# Patient Record
Sex: MALE | Race: WHITE | NOT HISPANIC OR LATINO | Employment: FULL TIME | URBAN - METROPOLITAN AREA
[De-identification: names, ages, dates, MRNs, and addresses within clinical notes are randomized per-mention and may not be internally consistent; named-entity substitution may affect disease eponyms.]

---
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
## 2020-03-25 ENCOUNTER — TELEPHONE (OUTPATIENT)
Dept: NEUROLOGY | Facility: CLINIC | Age: 20
End: 2020-03-25

## 2020-03-25 NOTE — TELEPHONE ENCOUNTER
----- Message from Deisy Keys sent at 3/25/2020  3:10 PM CDT -----  Contact: Caryn Aguero (grandmother) @ [phone]  Pts grandmother is requesting to speak with Dr Stoddard.  She says pt had a small seizure for the 1st time in a year and a half.  Would like to see if he needs to increase his medication.  Pls call.

## 2020-03-25 NOTE — TELEPHONE ENCOUNTER
Per patients grandmother, she believes patient may have has a small episode but she is unsure if he missed any dosages. Attempted to call pt but there was no answer. Left message

## 2020-03-26 ENCOUNTER — TELEPHONE (OUTPATIENT)
Dept: NEUROLOGY | Facility: CLINIC | Age: 20
End: 2020-03-26

## 2020-03-27 ENCOUNTER — TELEPHONE (OUTPATIENT)
Dept: NEUROLOGY | Facility: CLINIC | Age: 20
End: 2020-03-27

## 2020-03-27 NOTE — TELEPHONE ENCOUNTER
----- Message from Juli Whitmore RN sent at 3/26/2020  5:31 PM CDT -----  Contact: Joelle ( [facility] ) @ [phone]      ----- Message -----  From: Nicholas Diamond  Sent: 3/26/2020   2:59 PM CDT  To: Richi [facility]tan Staff    Caller requesting a return call from Mary to discuss pt's recent seizure activity, pls call

## 2020-03-30 DIAGNOSIS — R56.9 SEIZURES: Primary | ICD-10-CM

## 2020-04-27 ENCOUNTER — TELEPHONE (OUTPATIENT)
Dept: NEUROLOGY | Facility: CLINIC | Age: 20
End: 2020-04-27

## 2020-04-27 DIAGNOSIS — R56.9 SEIZURES: Primary | ICD-10-CM

## 2020-04-27 NOTE — TELEPHONE ENCOUNTER
----- Message from Mary Queen MA sent at 4/27/2020  1:03 PM CDT -----  Contact: Radha (mother) @ [phone]      ----- Message -----  From: Deisy Keys  Sent: 4/27/2020   8:26 AM CDT  To: Richi [facility]tan Staff    Calling to see if pt can be scheduled for labs.  Would like to know if it can be done outside of the hospital.  Pls call.

## 2020-05-12 ENCOUNTER — PATIENT MESSAGE (OUTPATIENT)
Dept: NEUROLOGY | Facility: CLINIC | Age: 20
End: 2020-05-12

## 2020-05-13 ENCOUNTER — PATIENT MESSAGE (OUTPATIENT)
Dept: NEUROLOGY | Facility: CLINIC | Age: 20
End: 2020-05-13

## 2020-05-15 ENCOUNTER — TELEPHONE (OUTPATIENT)
Dept: NEUROLOGY | Facility: CLINIC | Age: 20
End: 2020-05-15

## 2020-05-15 ENCOUNTER — OFFICE VISIT (OUTPATIENT)
Dept: NEUROLOGY | Facility: CLINIC | Age: 20
End: 2020-05-15
Payer: COMMERCIAL

## 2020-05-15 ENCOUNTER — PATIENT OUTREACH (OUTPATIENT)
Dept: ADMINISTRATIVE | Facility: OTHER | Age: 20
End: 2020-05-15

## 2020-05-15 DIAGNOSIS — F40.11 GENERALIZED SOCIAL PHOBIA: ICD-10-CM

## 2020-05-15 DIAGNOSIS — G43.019 COMMON MIGRAINE WITH INTRACTABLE MIGRAINE: ICD-10-CM

## 2020-05-15 DIAGNOSIS — G40.109: Primary | ICD-10-CM

## 2020-05-15 PROCEDURE — 99213 PR OFFICE/OUTPT VISIT, EST, LEVL III, 20-29 MIN: ICD-10-PCS | Mod: 95,,, | Performed by: PSYCHIATRY & NEUROLOGY

## 2020-05-15 PROCEDURE — 99213 OFFICE O/P EST LOW 20 MIN: CPT | Mod: 95,,, | Performed by: PSYCHIATRY & NEUROLOGY

## 2020-05-15 RX ORDER — ZONISAMIDE 100 MG/1
600 CAPSULE ORAL NIGHTLY
Qty: 540 CAPSULE | OUTPATIENT
Start: 2020-05-15 | End: 2020-05-28 | Stop reason: SDUPTHER

## 2020-05-15 NOTE — PROGRESS NOTES
"Name: Lakhwinder Li  MRN: [id number]   CSN: [id number]      Date: 05/15/2020    HISTORY OF PRESENT ILLNESS (HPI)  The patient is a 19 y.o. yo RHWM   The patient was initially referred for consultation by Dr. Chaney  The patient was accompanied by his mother and Grandmother who provided some of the history.      Clinic Visits  Interim History  Initially after starting zonisamide 400 mg a day was controlled.  Patient now reports that he has had recurrence nocturnal seizures.  He has not change the dose of medications.  He does have migraines which are infrequent and respond well to sumatriptan.  Is not started any new medications has had no new interim medical problems arise.    2019/07/12  Patient was EMU and recorded 7 seizures which were of frontal lobe origin.  At D/C he was started on zonisamide and takes 400 mg QD and has been seizure free since starting it.  He takes his meds BID and has been adherent.  He has no other medical problems.      Results for LAKHWINDER LI (MRN [id number]) as of 7/11/2019 16:06   Ref. Range 12/11/2018 13:56 1/10/2019 13:54   Zonisamide Latest Ref Range: 10 - 40 mcg/mL 24 27     Epilepsy History  Lakhwinder is referred for continued followup for chief complaint of abnormal noises. Family reports spells of random vocalizations.  Family show video in which Lakhwinder is walking and makes incomprennesible  sound while walking.  Onset about September 2017.  No triggers not worsen by anything.  No eye blinks twithcws, etc.      History of seizures starting around age of 9 years.  These were different from what he experiences today.  He would wake up with these but would not yell.  Further description is not available.  None in several years.      Feels at thimes that his "brain is dead".     He has migraines starting age 9.  They start with photo and phonophobia. Then throbbing bifrontal HA often lateralized to R or L side.   He has had N/V.  He would lie down in dark quiet room and try to go to sleep.  "     He graduated from  and presently is a freshman in college but is not enrolled this semester.       ED visits  Episodes of SE  Change in meds    Seizure Seminology  Seizure Type 1  Classification:   Aura -   Ictus  - Nonconv -  - Conv -  - Duration -   Post-ictal  - Symptoms  - Duration  Age of onset   Current Seizure Frequency -    Last Seizure    Seizure Type 2  Classification:   Aura -   Ictus  - Nonconv -  - Conv -  - Duration -   Post-ictal  - Symptoms  - Duration  Age of onset   Current Seizure Frequency -  Last Seizure    sz per month 2015 2016 2017 2018 2019 2020   Luis Enrique         Feb         Mar         Apr         May         Jd         Jul         Aug         Sep         Oct         Nov         Dec         Tot           Seizure Triggers  Sleep Deprivation -  None  Other medications -  None   Benadral   Tramadol   Other  Psych/stress -  None  Photic stimulation -  None  Hyperventilation -  None  Medical Problems -  None  Menses -   No  Sensory Stimulation    Light  No   Sound  No   Problem Solv No   Other  No  Missed dose of Rx None    AED Treatments  Present regimen      Prior treatments      Not tried  acetazolamide (Diamox, AZM)  Amantadine  brivitiracetam (Briviact, BRV)  carbamazepine (Tegretol, CBZ)  clobazam (Onfi or Frizium, CLB)  ethosuximide (Zarontin, ESM)  eslicarbazine (Aptiom, ESL)  felbamate (felbatol, FBM)  gabapentin (Neurontin, GPN)  lacosamide (Vimpat, LCS)   lamotrigine (Lamictal, LTG)   levetiracetam (Keppra, LEV)  methsuximide (Celontin, MSM)  oxcarbazepine (Trileptal OXC)  perampanel (Fycompa, FCP)   phenobarbital (Pb)  phenytoin (Dilantin, PHT)  pregabalin (Lyrica, PGB)  primidone (Mysoline, PRM)  rufinamide (Banzel, RUF)  tiagabine (Gabatril,  TGB)  topiramate (Topamax, TPM)  viagabatrin, (Sabril, VGB)  vagal nerve stimulator (VNS)  valproic acid (Depakote, VPA)  zonisamide (Zonegran, ZNA)  Benzodiazepines  diazepam - rectal (Diastatl)  diazepam - oral (Valium, DZ)  clonazepam  (Klonopin, CZP)  clorazepate (Tranxene, CLZ)  Ativan  Brain Stimulation  Vagal Nerve Stimulation-n/a  DBS- n/a    Adherence/Compliance method  Memory - yes  Mom or Spouse - Yes  Pill Box - no  Jluis calendar - no  Turn over medication bottle - no  Phone alarm - no    Seizure Evaluation  EEG Routine   - abnormal electroencephalogram due to the presence of burst of focal spikes over bilateral anterior parasagittal head regions with shifting lateralization.     EEG Ambulatory -   EEG\Video Monitoring   2018/11/08 - This is an abnormal long-term video EEG monitoring study due to   the presence of seven clinical seizures that were captured.  Based off of the   semiology and to a lesser degree the electrographic findings, these do appear to   be frontal lobe seizures, though it is rather difficult to comment with any   degree of confidence regarding the lateralization of the seizure focus.  MRI/MRA   - 2018/07/06  - Subtle small ill-defined region of T2 FLAIR signal-abnormality right centrum semiovale which is nonspecific differential to include sequela of remote vascular event.    CT/CTA Scan -   PET Scan -   Neuropsychological evaluation -   DEXA Scan    Potential Epilepsy Risk Factors:   Pregnancy/Labor/Delivery - full term uncomplicated pregnancy labor and vaginal delivery  Febrile seizures - none  Head injury  - none  CNS infection - none     Stroke - none  Family Hx of Sz - none    PAST MEDICAL HISTORY:   Active Ambulatory Problems     Diagnosis Date Noted    Common migraine with intractable migraine 09/22/2014    Elevated liver enzymes 07/13/2018    Focal seizure 07/21/2018    Nocturnal frontal lobe epilepsy type 1 10/25/2018    Anxiety disorder 10/25/2018    Epilepsy 11/06/2018     Resolved Ambulatory Problems     Diagnosis Date Noted    Right hand pain 02/19/2016    Chronic pain of right knee 09/22/2017    Achilles tendon pain 09/22/2017    Patellar tendonitis of right knee 09/22/2017    Chronic midline  low back pain 10/06/2017    Weakness of trunk musculature 10/23/2017    Muscle tightness 10/23/2017    Decreased ROM of lumbar spine 10/23/2017    Abnormal vocalization 06/29/2018     Past Medical History:   Diagnosis Date    Allergy     Headache     Seizures         PAST SURGICAL HISTORY:   Past Surgical History:   Procedure Laterality Date    TYMPANOSTOMY TUBE PLACEMENT          FAMILY HISTORY:   Family History   Problem Relation Age of Onset    Migraines Mother     Rheum arthritis Mother     Fibromyalgia Mother     Migraines Maternal Grandmother     Hypertension Maternal Grandmother     No Known Problems Father     Heart disease Paternal Grandfather     Hyperlipidemia Paternal Grandfather     Hypertension Paternal Grandfather          SOCIAL HISTORY:   Social History     Socioeconomic History    Marital status: Single     Spouse name: Not on file    Number of children: Not on file    Years of education: Not on file    Highest education level: Not on file   Occupational History    Not on file   Social Needs    Financial resource strain: Not on file    Food insecurity:     Worry: Not on file     Inability: Not on file    Transportation needs:     Medical: Not on file     Non-medical: Not on file   Tobacco Use    Smoking status: Never Smoker    Smokeless tobacco: Never Used   Substance and Sexual Activity    Alcohol use: No    Drug use: No    Sexual activity: Never   Lifestyle    Physical activity:     Days per week: Not on file     Minutes per session: Not on file    Stress: Not on file   Relationships    Social connections:     Talks on phone: Not on file     Gets together: Not on file     Attends Caodaism service: Not on file     Active member of club or organization: Not on file     Attends meetings of clubs or organizations: Not on file     Relationship status: Not on file   Other Topics Concern    Not on file   Social History Narrative    Lives with mom    5 dogs    Attends  "[employer] - sr        SUBSTANCE USE:  Social History     Tobacco Use    Smoking status: Never Smoker    Smokeless tobacco: Never Used   Substance and Sexual Activity    Alcohol use: No    Drug use: No    Sexual activity: Never      Social History     Tobacco Use    Smoking status: Never Smoker    Smokeless tobacco: Never Used   Substance Use Topics    Alcohol use: No        ALLERGIES: Rocephin [ceftriaxone]     There were no vitals taken for this visit.  [unfilled]  Higher Cortical Function:    Patient is a well developed, pleasant, well groomed individual appearing their stated age  Oriented - intact to person, place and time and followed two step instruction correctly.    Spell WORLD - Patients response: forward - WORLD; backwards -   Subtraction of serial 7s from 100 - 3 steps performed correct  Memory - Patient recalled 3 of 3 objects after 5 minutes  Fund of knowledge was appropriate.    R-L Orientation - Intact - Impaired  Language - Speech was fluent without evidence for an aphasia.  Agnosias, agraphesthesia, or astereognosis - not present.   Extinction with double simultaneous stimulation:        Proximal-distal stimulation - Not present        Right-left stimulation - Not present  Cranial Nerves II - XII:    EOMs were intact with normal smooth and no nystagmus.    PERRLA. D/C   Funduscopic exam - disc were flat with normal A/V ratio and no exudates or hemorrhages. Visual fields were full to confrontation.    Motor - facial movement was symmetrical and normal.    Facial sensory - Light touch and pin prick sensations were normal.    Hearing was normal to finger rub.  Palate moved well and was symmetrical with normal palatal and oral sensation.    Tongue movement was full & the patient could say "la la la" and "Ka Ka Ka" without  difficulty. Patient repeated Yazdanism and Christian without difficulty. Normal power and bulk was found in the massiter and rotator muscles of the neck.  Motor: Power, bulk and " tone were normal in all extremities.  Sensory: Light touch, pin prick, vibration and position senses were normal in all extremities.    Coordination:       Rapid alternating movements and rapid finger tapping - normal.       Finger to nose - nl.       Arm roll - symmetrical.    Gait:  Station, gait and tandem walking were done without difficulty and Romberg was negative.  Frontal release signs  Sign Left Right   Glabellar Mild    Snout absent absent   Root absent absent   Suck absent absent   Palmomental absent absent          Deep tendon reflexes:    Reflex L R   Bicpets 2+ 2+   Tricepts 2+ 2+   Brachio-radialis 2+ 2+   Knee 2+ 2+   Ankle 2+ 2+   Babinski No No     Tremor: resting, postural, intentional - none    Pulses    Carotids - strong without bruits    Peripheral - strong and symmetrical      IMPRESSION  1. Frontal lobe seizure   2. Migraine headaches with aura  3. Anxiety disorder - on Wellbutrin    DISPOSITION:   Zonisamide level to be drawn today or tomorrow.  Continue zonisamide but increase dose from 400 mg to 600 mg QD  2. Since side levels to be drawn Wellbutrin has been stopped.  3. Followup in 3 mo.

## 2020-05-15 NOTE — TELEPHONE ENCOUNTER
----- Message from Magui Bolaños sent at 5/15/2020  3:36 PM CDT -----  Contact: Grandmother - Caryn Aguero  Grandmother states the pt had labs completed approximately a wk and a half ago, ask if pt need to have lab re-done ask for a call    Contact info  [phone]

## 2020-05-16 ENCOUNTER — LAB VISIT (OUTPATIENT)
Dept: LAB | Facility: HOSPITAL | Age: 20
End: 2020-05-16
Attending: PSYCHIATRY & NEUROLOGY
Payer: COMMERCIAL

## 2020-05-16 DIAGNOSIS — G43.019 COMMON MIGRAINE WITH INTRACTABLE MIGRAINE: ICD-10-CM

## 2020-05-16 DIAGNOSIS — G40.109: ICD-10-CM

## 2020-05-16 PROCEDURE — 80203 DRUG SCREEN QUANT ZONISAMIDE: CPT

## 2020-05-16 PROCEDURE — 36415 COLL VENOUS BLD VENIPUNCTURE: CPT

## 2020-05-19 LAB — ZONISAMIDE SERPL-MCNC: <1 MCG/ML (ref 10–40)

## 2020-05-28 ENCOUNTER — PATIENT MESSAGE (OUTPATIENT)
Dept: NEUROLOGY | Facility: CLINIC | Age: 20
End: 2020-05-28

## 2020-05-28 DIAGNOSIS — G43.019 COMMON MIGRAINE WITH INTRACTABLE MIGRAINE: ICD-10-CM

## 2020-05-28 DIAGNOSIS — G40.109: ICD-10-CM

## 2020-05-28 RX ORDER — ZONISAMIDE 100 MG/1
600 CAPSULE ORAL NIGHTLY
Qty: 540 CAPSULE | OUTPATIENT
Start: 2020-05-28 | End: 2020-06-02 | Stop reason: SDUPTHER

## 2020-05-29 ENCOUNTER — LAB VISIT (OUTPATIENT)
Dept: LAB | Facility: HOSPITAL | Age: 20
End: 2020-05-29
Attending: PSYCHIATRY & NEUROLOGY
Payer: COMMERCIAL

## 2020-05-29 ENCOUNTER — TELEPHONE (OUTPATIENT)
Dept: NEUROLOGY | Facility: CLINIC | Age: 20
End: 2020-05-29

## 2020-05-29 DIAGNOSIS — R56.9 SEIZURES: Primary | ICD-10-CM

## 2020-05-29 DIAGNOSIS — R56.9 SEIZURES: ICD-10-CM

## 2020-05-29 PROCEDURE — 36415 COLL VENOUS BLD VENIPUNCTURE: CPT

## 2020-05-29 PROCEDURE — 80203 DRUG SCREEN QUANT ZONISAMIDE: CPT

## 2020-05-29 NOTE — TELEPHONE ENCOUNTER
----- Message from Tsering Mosher sent at 5/29/2020  8:12 AM CDT -----  Mary -- pt mom Joelle would like a call. She stated her son is not having normal seizures, it like more violent Call [phone]

## 2020-05-29 NOTE — TELEPHONE ENCOUNTER
Spoke to Joelle. Advised that the patients most recent lab results show no Zonisamide in his system. She states she has been giving him the medicine herself the last 2 weeks. Told her we need to recheck his levels

## 2020-06-02 ENCOUNTER — TELEPHONE (OUTPATIENT)
Dept: NEUROLOGY | Facility: CLINIC | Age: 20
End: 2020-06-02

## 2020-06-02 DIAGNOSIS — G43.019 COMMON MIGRAINE WITH INTRACTABLE MIGRAINE: ICD-10-CM

## 2020-06-02 DIAGNOSIS — G40.109: ICD-10-CM

## 2020-06-02 LAB — ZONISAMIDE SERPL-MCNC: 10 MCG/ML (ref 10–40)

## 2020-06-02 NOTE — TELEPHONE ENCOUNTER
----- Message from Uriah Barbosa sent at 6/2/2020 10:00 AM CDT -----  Contact: bryant @ [phone]  Asking to speak w/ Mary about blood work results

## 2020-06-02 NOTE — TELEPHONE ENCOUNTER
Spoke to patients mother. Advised levels have increase. Will consult with Dr Reynoso to see if she would like to make any changes

## 2020-06-02 NOTE — TELEPHONE ENCOUNTER
Per Dr Reynoso, increase Zonisamide to 400mg qhs for 3 days then increase to 400mg BID. Recheck levels in 1 month, New prescription sent to Dr Reynoso for approval

## 2020-06-04 RX ORDER — ZONISAMIDE 100 MG/1
400 CAPSULE ORAL 2 TIMES DAILY
Qty: 240 CAPSULE | Refills: 3 | Status: SHIPPED | OUTPATIENT
Start: 2020-06-04 | End: 2020-08-06

## 2020-06-24 ENCOUNTER — TELEPHONE (OUTPATIENT)
Dept: NEUROLOGY | Facility: CLINIC | Age: 20
End: 2020-06-24

## 2020-06-24 DIAGNOSIS — R56.9 SEIZURES: Primary | ICD-10-CM

## 2020-06-24 NOTE — TELEPHONE ENCOUNTER
Spoke with  about Mother reporting patient having a seizure yesterday. Zonisamide level ordered. Mother states she will take him on tomorrow.

## 2020-06-24 NOTE — TELEPHONE ENCOUNTER
----- Message from Lucrecia Sarbjit sent at 6/24/2020  9:11 AM CDT -----  Regarding: Seizure  Contact: Mother  Medication was increased as was suggested and he went four days without a seizure.  And on yesterday he had a pretty intense seizure about 1:30pm and she wants to discuss. Please contact her at [phone]

## 2020-06-29 ENCOUNTER — LAB VISIT (OUTPATIENT)
Dept: LAB | Facility: HOSPITAL | Age: 20
End: 2020-06-29
Attending: PSYCHIATRY & NEUROLOGY
Payer: COMMERCIAL

## 2020-06-29 DIAGNOSIS — R56.9 SEIZURES: ICD-10-CM

## 2020-06-29 PROCEDURE — 36415 COLL VENOUS BLD VENIPUNCTURE: CPT

## 2020-06-29 PROCEDURE — 80203 DRUG SCREEN QUANT ZONISAMIDE: CPT

## 2020-07-01 LAB — ZONISAMIDE SERPL-MCNC: 60 MCG/ML (ref 10–40)

## 2020-07-02 ENCOUNTER — TELEPHONE (OUTPATIENT)
Dept: NEUROLOGY | Facility: CLINIC | Age: 20
End: 2020-07-02

## 2020-07-02 NOTE — TELEPHONE ENCOUNTER
Mom called to check on patients zonisamide level. She stated that he is still having 1 seizure every 2-3 days. It lasts approx. 4-5 seconds. She states it is a verbal outburst. She states that things are much better now that he is taking his meds. Mom aware that she should take him to the ED if needed.

## 2020-07-08 ENCOUNTER — TELEPHONE (OUTPATIENT)
Dept: NEUROLOGY | Facility: CLINIC | Age: 20
End: 2020-07-08

## 2020-07-21 ENCOUNTER — TELEPHONE (OUTPATIENT)
Dept: NEUROLOGY | Facility: CLINIC | Age: 20
End: 2020-07-21

## 2020-07-21 NOTE — TELEPHONE ENCOUNTER
----- Message from Keily Storey sent at 7/21/2020  3:58 PM CDT -----  Contact: Joelle @ [phone]  Pts mom says he is still having seizures. She is wanting to know if the pt is going to be taking a mid dose or a different medication. They are wanting to know this because he is about to start school and want him to familiar w/ new routine before he starts.

## 2020-07-21 NOTE — TELEPHONE ENCOUNTER
Left message advising that we have been trying to get patient into the clinic or a virtual visit. Asked to contact office to schedule

## 2020-07-28 ENCOUNTER — OFFICE VISIT (OUTPATIENT)
Dept: NEUROLOGY | Facility: CLINIC | Age: 20
End: 2020-07-28
Payer: COMMERCIAL

## 2020-07-28 DIAGNOSIS — G40.109: Primary | ICD-10-CM

## 2020-07-28 PROCEDURE — 99214 OFFICE O/P EST MOD 30 MIN: CPT | Mod: 95,,, | Performed by: PSYCHIATRY & NEUROLOGY

## 2020-07-28 PROCEDURE — 99214 PR OFFICE/OUTPT VISIT, EST, LEVL IV, 30-39 MIN: ICD-10-PCS | Mod: 95,,, | Performed by: PSYCHIATRY & NEUROLOGY

## 2020-07-28 RX ORDER — LAMOTRIGINE 100 MG/1
200 TABLET ORAL DAILY
Qty: 60 TABLET | Refills: 5 | Status: SHIPPED | OUTPATIENT
Start: 2020-07-28 | End: 2020-08-06

## 2020-07-28 RX ORDER — LAMOTRIGINE 25 MG/1
75 TABLET, ORALLY DISINTEGRATING ORAL DAILY
Qty: 90 TABLET | Refills: 1 | Status: SHIPPED | OUTPATIENT
Start: 2020-07-28 | End: 2020-08-06

## 2020-07-28 NOTE — PROGRESS NOTES
Right I have a note appear slow you back the but couple weeks ago the or but were theta wave a the eye blood work showed year level of the 60 event a couple the back on May 29th it was 10 which had not sure why but that is lower the bed vastus last 60 that were we need a CT be feeling okay the and no seizures and she got out of the in view on no a warm degree the background mom had a rate down like bed very recorded month but is a consistent with a consist side Name: Lakhwinder Li  MRN: [id number]   CSN: [id number]      Date: 07/28/2020    HISTORY OF PRESENT ILLNESS (HPI)  The patient is a 19 y.o. yo RHWM   The patient was initially referred for consultation by Dr. Chaney  The patient was accompanied by his mother and Grandmother who provided some of the history.      Clinic Visits  Interim History    2020/07/28  He continues to have some seizures.  Six months ago they occurred daily and occur during sleep and when awake.  Presently he has 2-3 per week.  They are shorter but the patient does not know the duration and family is not available at the moment.      Results for LAKHWINDER LI (MRN [id number]) as of 7/28/2020 10:40   Ref. Range 5/29/2020 13:19 6/29/2020 09:32   Zonisamide Latest Ref Range: 10 - 40 mcg/mL 10 60 (H)     2019/07/12  Patient was EMU and recorded 7 seizures which were of frontal lobe origin.  At D/C he was started on zonisamide and takes 400 mg QD and has been seizure free since starting it.  He takes his meds BID and has been adherent.  He has no other medical problems.      Initially after starting zonisamide 400 mg a day was controlled.  Patient now reports that he has had recurrence nocturnal seizures.  He has not change the dose of medications.  He does have migraines which are infrequent and respond well to sumatriptan.  Is not started any new medications has had no new interim medical problems arise.    Results for LAKHWINDER LI (MRN [id number]) as of 7/11/2019 16:06   Ref. Range 12/11/2018 13:56  "1/10/2019 13:54   Zonisamide Latest Ref Range: 10 - 40 mcg/mL 24 27     Epilepsy History  Alejandro is referred for continued followup for chief complaint of abnormal noises. Family reports spells of random vocalizations.  Family show video in which Alejandro is walking and makes incomprennesible  sound while walking.  Onset about September 2017.  No triggers not worsen by anything.  No eye blinks twithcws, etc.      History of seizures starting around age of 9 years.  These were different from what he experiences today.  He would wake up with these but would not yell.  Further description is not available.  None in several years.      Feels at thimes that his "brain is dead".     He has migraines starting age 9.  They start with photo and phonophobia. Then throbbing bifrontal HA often lateralized to R or L side.   He has had N/V.  He would lie down in dark quiet room and try to go to sleep.      He graduated from  and presently is a freshman in college but is not enrolled this semester.       ED visits  Episodes of SE  Change in meds    Seizure Seminology  Seizure Type 1  Classification:   Aura -   Ictus  - Nonconv -  - Conv -  - Duration -   Post-ictal  - Symptoms  - Duration  Age of onset   Current Seizure Frequency -    Last Seizure    Seizure Type 2  Classification:   Aura -   Ictus  - Nonconv -  - Conv -  - Duration -   Post-ictal  - Symptoms  - Duration  Age of onset   Current Seizure Frequency -  Last Seizure    sz per month 2015 2016 2017 2018 2019 2020   Luis Enrique         Feb         Mar         Apr         May         Jd         Jul         Aug         Sep         Oct         Nov         Dec         Tot           Seizure Triggers  Sleep Deprivation -  None  Other medications -  None   Benadral   Tramadol   Other  Psych/stress -  None  Photic stimulation -  None  Hyperventilation -  None  Medical Problems -  None  Menses -   No  Sensory Stimulation    Light  No   Sound  No   Problem Solv No   Other  No  Missed dose " of Rx None    AED Treatments  Present regimen      Prior treatments  levetiracetam (Keppra, LEV)   topiramate (Topamax, TPM)    Not tried  acetazolamide (Diamox, AZM)  Amantadine  brivitiracetam (Briviact, BRV)  carbamazepine (Tegretol, CBZ)  clobazam (Onfi or Frizium, CLB)  ethosuximide (Zarontin, ESM)  eslicarbazine (Aptiom, ESL)  felbamate (felbatol, FBM)  gabapentin (Neurontin, GPN)  lacosamide (Vimpat, LCS)   lamotrigine (Lamictal, LTG)   methsuximide (Celontin, MSM)  oxcarbazepine (Trileptal OXC)  perampanel (Fycompa, FCP)   phenobarbital (Pb)  phenytoin (Dilantin, PHT)  pregabalin (Lyrica, PGB)  primidone (Mysoline, PRM)  rufinamide (Banzel, RUF)  tiagabine (Gabatril,  TGB)  viagabatrin, (Sabril, VGB)  vagal nerve stimulator (VNS)  valproic acid (Depakote, VPA)  zonisamide (Zonegran, ZNA)  Benzodiazepines  diazepam - rectal (Diastatl)  diazepam - oral (Valium, DZ)  clonazepam (Klonopin, CZP)  clorazepate (Tranxene, CLZ)  Ativan  Brain Stimulation  Vagal Nerve Stimulation-n/a  DBS- n/a    Adherence/Compliance method  Memory - yes  Mom or Spouse - Yes  Pill Box - no  Jluis calendar - no  Turn over medication bottle - no  Phone alarm - no    Seizure Evaluation  EEG Routine   - abnormal electroencephalogram due to the presence of burst of focal spikes over bilateral anterior parasagittal head regions with shifting lateralization.     EEG Ambulatory -   EEG\Video Monitoring   2018/11/08 - This is an abnormal long-term video EEG monitoring study due to   the presence of seven clinical seizures that were captured.  Based off of the   semiology and to a lesser degree the electrographic findings, these do appear to   be frontal lobe seizures, though it is rather difficult to comment with any   degree of confidence regarding the lateralization of the seizure focus.  MRI/MRA   - 2018/07/06  - Subtle small ill-defined region of T2 FLAIR signal-abnormality right centrum semiovale which is nonspecific differential to  include sequela of remote vascular event.    CT/CTA Scan -   PET Scan -   Neuropsychological evaluation -   DEXA Scan    Potential Epilepsy Risk Factors:   Pregnancy/Labor/Delivery - full term uncomplicated pregnancy labor and vaginal delivery  Febrile seizures - none  Head injury  - none  CNS infection - none     Stroke - none  Family Hx of Sz - none    PAST MEDICAL HISTORY:   Active Ambulatory Problems     Diagnosis Date Noted    Common migraine with intractable migraine 09/22/2014    Elevated liver enzymes 07/13/2018    Focal seizure 07/21/2018    Nocturnal frontal lobe epilepsy type 1 10/25/2018    Anxiety disorder 10/25/2018    Epilepsy 11/06/2018     Resolved Ambulatory Problems     Diagnosis Date Noted    Right hand pain 02/19/2016    Chronic pain of right knee 09/22/2017    Achilles tendon pain 09/22/2017    Patellar tendonitis of right knee 09/22/2017    Chronic midline low back pain 10/06/2017    Weakness of trunk musculature 10/23/2017    Muscle tightness 10/23/2017    Decreased ROM of lumbar spine 10/23/2017    Abnormal vocalization 06/29/2018     Past Medical History:   Diagnosis Date    Allergy     Headache     Seizures         PAST SURGICAL HISTORY:   Past Surgical History:   Procedure Laterality Date    TYMPANOSTOMY TUBE PLACEMENT          FAMILY HISTORY:   Family History   Problem Relation Age of Onset    Migraines Mother     Rheum arthritis Mother     Fibromyalgia Mother     Migraines Maternal Grandmother     Hypertension Maternal Grandmother     No Known Problems Father     Heart disease Paternal Grandfather     Hyperlipidemia Paternal Grandfather     Hypertension Paternal Grandfather          SOCIAL HISTORY:   Social History     Socioeconomic History    Marital status: Single     Spouse name: Not on file    Number of children: Not on file    Years of education: Not on file    Highest education level: Not on file   Occupational History    Not on file   Social Needs     Financial resource strain: Not on file    Food insecurity     Worry: Not on file     Inability: Not on file    Transportation needs     Medical: Not on file     Non-medical: Not on file   Tobacco Use    Smoking status: Never Smoker    Smokeless tobacco: Never Used   Substance and Sexual Activity    Alcohol use: No    Drug use: No    Sexual activity: Never   Lifestyle    Physical activity     Days per week: Not on file     Minutes per session: Not on file    Stress: Not on file   Relationships    Social connections     Talks on phone: Not on file     Gets together: Not on file     Attends Restorationist service: Not on file     Active member of club or organization: Not on file     Attends meetings of clubs or organizations: Not on file     Relationship status: Not on file   Other Topics Concern    Not on file   Social History Narrative    Lives with mom    5 dogs    Attends [employer] -         SUBSTANCE USE:  Social History     Tobacco Use    Smoking status: Never Smoker    Smokeless tobacco: Never Used   Substance and Sexual Activity    Alcohol use: No    Drug use: No    Sexual activity: Never      Social History     Tobacco Use    Smoking status: Never Smoker    Smokeless tobacco: Never Used   Substance Use Topics    Alcohol use: No        ALLERGIES: Rocephin [ceftriaxone]     There were no vitals taken for this visit.  [unfilled]  Higher Cortical Function:    Patient is a well developed, pleasant, well groomed individual appearing their stated age  Oriented - intact to person, place and time and followed two step instruction correctly.    Spell WORLD - Patients response: forward - WORLD; backwards -   Subtraction of serial 7s from 100 - 3 steps performed correct  Memory - Patient recalled 3 of 3 objects after 5 minutes  Fund of knowledge was appropriate.    R-L Orientation - Intact - Impaired  Language - Speech was fluent without evidence for an aphasia.  Agnosias, agraphesthesia, or  "astereognosis - not present.   Extinction with double simultaneous stimulation:        Proximal-distal stimulation - Not present        Right-left stimulation - Not present  Cranial Nerves II - XII:    EOMs were intact with normal smooth and no nystagmus.    PERRLA. D/C   Funduscopic exam - disc were flat with normal A/V ratio and no exudates or hemorrhages. Visual fields were full to confrontation.    Motor - facial movement was symmetrical and normal.    Facial sensory - Light touch and pin prick sensations were normal.    Hearing was normal to finger rub.  Palate moved well and was symmetrical with normal palatal and oral sensation.    Tongue movement was full & the patient could say "la la la" and "Ka Ka Ka" without  difficulty. Patient repeated Lutheran and Evangel[employer] without difficulty. Normal power and bulk was found in the massiter and rotator muscles of the neck.  Motor: Power, bulk and tone were normal in all extremities.  Sensory: Light touch, pin prick, vibration and position senses were normal in all extremities.    Coordination:       Rapid alternating movements and rapid finger tapping - normal.       Finger to nose - nl.       Arm roll - symmetrical.    Gait:  Station, gait and tandem walking were done without difficulty and Romberg was negative.  Frontal release signs  Sign Left Right   Glabellar Mild    Snout absent absent   Root absent absent   Suck absent absent   Palmomental absent absent          Deep tendon reflexes:    Reflex L R   Bicpets 2+ 2+   Tricepts 2+ 2+   Brachio-radialis 2+ 2+   Knee 2+ 2+   Ankle 2+ 2+   Babinski No No     Tremor: resting, postural, intentional - none    Pulses    Carotids - strong without bruits    Peripheral - strong and symmetrical      IMPRESSION  1. Frontal lobe seizure - sz frequency has significantly reduced with the higher doses of ZNA but he continues to have weekly sz.  Additional Rx is indicated.   2. Migraine headaches with aura  3. Anxiety disorder - " on Wellbutrin    DISPOSITION:   Continue zonisamide 600 mg QD  2. Start lamotrigine and taper up to 200 mg QD  3. Wellbutrin has been stopped.  3. Followup in 3 mo.

## 2020-08-05 ENCOUNTER — TELEPHONE (OUTPATIENT)
Dept: FAMILY MEDICINE | Facility: CLINIC | Age: 20
End: 2020-08-05

## 2020-08-06 ENCOUNTER — OFFICE VISIT (OUTPATIENT)
Dept: FAMILY MEDICINE | Facility: CLINIC | Age: 20
End: 2020-08-06
Payer: COMMERCIAL

## 2020-08-06 DIAGNOSIS — Z20.822 SUSPECTED COVID-19 VIRUS INFECTION: Primary | ICD-10-CM

## 2020-08-06 DIAGNOSIS — F40.11 GENERALIZED SOCIAL PHOBIA: ICD-10-CM

## 2020-08-06 DIAGNOSIS — R56.9 FOCAL SEIZURE: ICD-10-CM

## 2020-08-06 PROCEDURE — 99214 PR OFFICE/OUTPT VISIT, EST, LEVL IV, 30-39 MIN: ICD-10-PCS | Mod: 95,,, | Performed by: PHYSICIAN ASSISTANT

## 2020-08-06 PROCEDURE — 99214 OFFICE O/P EST MOD 30 MIN: CPT | Mod: 95,,, | Performed by: PHYSICIAN ASSISTANT

## 2020-08-06 RX ORDER — ZONISAMIDE 100 MG/1
CAPSULE ORAL
COMMUNITY
Start: 2019-08-02 | End: 2020-10-28 | Stop reason: SDUPTHER

## 2020-08-06 RX ORDER — VENLAFAXINE HYDROCHLORIDE 75 MG/1
CAPSULE, EXTENDED RELEASE ORAL
COMMUNITY
Start: 2020-07-25

## 2020-08-06 RX ORDER — ALBUTEROL SULFATE 90 UG/1
2 AEROSOL, METERED RESPIRATORY (INHALATION) EVERY 6 HOURS PRN
Qty: 18 G | Refills: 0 | Status: SHIPPED | OUTPATIENT
Start: 2020-08-06

## 2020-08-06 NOTE — PROGRESS NOTES
Subjective:       Patient ID: Alejandro Lopez is a 19 y.o. male with multiple medical diagnoses as listed in the medical history and problem list that presents for Chest Pain  .    Chief Complaint: Chest Pain    The patient location is: at home  The chief complaint leading to consultation is: chest pain   Visit type: Virtual visit with synchronous audio and video  Quality of audio: good  Quality of sound: good  Each patient to whom he or she provides medical services by telemedicine is:  (1) informed of the relationship between the physician and patient and the respective role of any other health care provider with respect to management of the patient; and (2) notified that he or she may decline to receive medical services by telemedicine and may withdraw from such care at any time.    Chest Pain   This is a new problem. The current episode started in the past 7 days (monday ). The problem occurs intermittently. The pain is moderate. Associated symptoms include palpitations and shortness of breath (occassionally ). Pertinent negatives include no back pain, cough, dizziness, fever or headaches. Associated with: lying down  He has tried nothing for the symptoms.   URI   This is a new problem. The current episode started in the past 7 days (mon or so ). Associated symptoms include chest pain, neck pain, rhinorrhea (monday ), sneezing and a sore throat. Pertinent negatives include no congestion, coughing, diarrhea, dysuria, ear pain, headaches, sinus pain or wheezing. He has tried nothing for the symptoms.      Mom works at dental office  Has not worked since June  Only sees his grandmother otherwise  States he doesn't go out     Review of Systems   Constitutional: Positive for appetite change, chills and fatigue. Negative for fever.   HENT: Positive for postnasal drip, rhinorrhea (monday ), sneezing and sore throat. Negative for congestion, ear pain, sinus pressure and sinus pain.         No loss of taste or smell     Eyes: Negative for photophobia, pain, discharge, redness and itching.   Respiratory: Positive for chest tightness and shortness of breath (occassionally ). Negative for cough and wheezing.    Cardiovascular: Positive for chest pain and palpitations. Negative for leg swelling.   Gastrointestinal: Negative for diarrhea.   Genitourinary: Negative for dysuria.   Musculoskeletal: Positive for myalgias (mon-tues improving ) and neck pain. Negative for arthralgias, back pain and neck stiffness.   Neurological: Negative for dizziness and headaches.   Psychiatric/Behavioral: Positive for sleep disturbance.         PAST MEDICAL HISTORY:  Past Medical History:   Diagnosis Date    Allergy     Headache     Seizures        SOCIAL HISTORY:  Social History     Socioeconomic History    Marital status: Single     Spouse name: Not on file    Number of children: Not on file    Years of education: Not on file    Highest education level: Not on file   Occupational History    Not on file   Social Needs    Financial resource strain: Not on file    Food insecurity     Worry: Not on file     Inability: Not on file    Transportation needs     Medical: Not on file     Non-medical: Not on file   Tobacco Use    Smoking status: Never Smoker    Smokeless tobacco: Never Used   Substance and Sexual Activity    Alcohol use: No    Drug use: No    Sexual activity: Never   Lifestyle    Physical activity     Days per week: Not on file     Minutes per session: Not on file    Stress: Not on file   Relationships    Social connections     Talks on phone: Not on file     Gets together: Not on file     Attends Yazidism service: Not on file     Active member of club or organization: Not on file     Attends meetings of clubs or organizations: Not on file     Relationship status: Not on file   Other Topics Concern    Not on file   Social History Narrative    Lives with mom    5 dogs    Attends [employer]       ALLERGIES AND MEDICATIONS:  updated and reviewed.  Review of patient's allergies indicates:   Allergen Reactions    Rocephin [ceftriaxone]      Current Outpatient Medications   Medication Sig Dispense Refill    sumatriptan (IMITREX) 50 MG tablet Take 1 tablet (50 mg total) by mouth daily as needed for Migraine. 9 tablet 0    venlafaxine (EFFEXOR-XR) 75 MG 24 hr capsule       zonisamide (ZONEGRAN) 100 MG Cap TAKE 4 CAPSULES EVERY EVENING      albuterol (PROVENTIL/VENTOLIN HFA) 90 mcg/actuation inhaler Inhale 2 puffs into the lungs every 6 (six) hours as needed for Wheezing or Shortness of Breath. 18 g 0     No current facility-administered medications for this visit.          Objective:   There were no vitals taken for this visit.     Physical Exam  Constitutional:       Appearance: He is not ill-appearing.   HENT:      Head:      Comments: clearing throat   Eyes:      Extraocular Movements: Extraocular movements intact.   Neurological:      Mental Status: He is alert and oriented to person, place, and time.   Psychiatric:         Attention and Perception: Attention normal.         Mood and Affect: Mood is anxious.         Speech: Speech normal.         Thought Content: Thought content normal.         Cognition and Memory: Cognition normal.             Assessment:       1. Suspected Covid-19 Virus Infection    2. Generalized social phobia    3. Focal seizure        Plan:       Suspected Covid-19 Virus Infection  -     albuterol (PROVENTIL/VENTOLIN HFA) 90 mcg/actuation inhaler; Inhale 2 puffs into the lungs every 6 (six) hours as needed for Wheezing or Shortness of Breath.  Dispense: 18 g; Refill: 0  Zyrtec  Rest  Fluids  Instructions for Patients with Confirmed or Suspected COVID-19    If you are awaiting your test result, you will either be called or it will be released to the patient portal.  If you have any questions about your test, please visit [email or web address] or call our COVID-19 information line at  [phone].      Instructions for non-hospitalized or discharged patients with confirmed or suspected COVID-19:       Stay home except to get medical care.    Separate yourself from other people and animals in your home.    Call ahead before visiting your doctor.    Wear a face mask.    Cover your coughs and sneezes.    Clean your hands often.    Avoid sharing personal household items.    Clean all high-touch surfaces every day.    Monitor your symptoms. Seek prompt medical attention if your illness is worsening (e.g., difficulty breathing). Before seeking care, call your healthcare provider.    If you have a medical emergency and must call 911, notify the dispatcher that you have or are being evaluated for COVID-19. If possible, put on a face mask before emergency medical services arrive.    Use the following symptom-based strategy to return to normal activity following a suspected or confirmed case of COVID-19. Continue isolation until:   o At least 3 days (72 hours) have passed since recovery defined as resolution of fever without the use of fever-reducing medications and improvement in respiratory symptoms (e.g. cough, shortness of breath), and   o At least 10 days have passed since the first positive test.       As one of the next steps, you will receive a call or text from the [place] Department of Health ([facility]) COVID-19 Tracing Team. See the contact information below so you know not to ignore the health departments call. It is important that you contact them back immediately so they can help.     Contact Tracer Number:  [phone]  Caller ID for most carriers: [place] Dept Health    What is contact tracing?   Contact tracing is a process that helps identify everyone who has been in close contact with an infected person. Contact tracers let those people know they may have been exposed and guide them on next steps. Confidentiality is important for everyone; no one will be told who may have  exposed them to the virus.   Your involvement is important. The more we know about where and how this virus is spreading, the better chance we have at stopping it from spreading further.  What does exposure mean?   Exposure means you have been within 6 feet for more than 15 minutes with a person who has or had COVID-19.  What kind of questions do the contact tracers ask?   A contact tracer will confirm your basic contact information including name, address, phone number, and next of kin, as well as asking about any symptoms you may have had. Theyll also ask you how you think you may have gotten sick, such as places where you may have been exposed to the virus, and people you were with. Those names will never be shared with anyone outside of that call, and will only be used to help trace and stop the spread of the virus.   I have privacy concerns. How will the state use my information?   Your privacy about your health is important. All calls are completed using call centers that use the appropriate health privacy protection measures (HIPAA compliance), meaning that your patient information is safe. No one will ever ask you any questions related to immigration status. Your health comes first.   Do I have to participate?   You do not have to participate, but we strongly encourage you to. Contact tracing can help us catch and control new outbreaks as theyre developing to keep your friends and family safe.   What if I dont hear from anyone?   If you dont receive a call within 24 hours, you can call the number above right away to inquire about your status. That line is open from 8:00 am - 8:00 p.m., 7 days a week.  Contact tracing saves lives! Together, we have the power to beat this virus and keep our loved ones and neighbors safe.       Instructions for household members, intimate partners and caregivers in a non-healthcare setting of a patient with confirmed or suspected COVID-19:         Close contacts  should monitor their health and call their healthcare provider right away if they develop symptoms suggestive of COVID-19 (e.g., fever, cough, shortness of breath).    Stay home except to get medical care. Separate yourself from other people and animals in the home.   Monitor the patients symptoms. If the patient is getting sicker, call his or her healthcare provider. If the patient has a medical emergency and you need to call 911, notify the dispatch personnel that the patient has or is being evaluated for COVID-19.    Wear a facemask when around other people such as sharing a room or vehicle and before entering a healthcare provider's office.   Cover coughs and sneezes with a tissue. Throw used tissues in a lined trash can immediately and wash hands.   Clean hands often with soap and water for at least 20 seconds or with an alcohol-based hand , rubbing hands together until they feel dry. Avoid touching your eyes, nose, and mouth with unwashed hands.   Clean all high-touch; surfaces every day, including counters, tabletops, doorknobs, bathroom fixtures, toilets, phones, keyboards, tablets, bedside tables, etc. Use a household cleaning spray or wipe according to label instructions.   Avoid sharing personal household items such as dishes, drinking glasses, cups, towels, bedding, etc. After these items are used, they should be washed thoroughly with soap and water.   Continue isolation until:   At least 3 days (72 hours) have passed since recovery defined as resolution of fever without the use of fever-reducing medications and improvement in respiratory symptoms (e.g. cough, shortness of breath), and    At least 10 days have passed since the patients first positive test.    https://www.cdc.gov/coronavirus/2019-ncov/your-health/index.htm      Generalized social phobia  The current medical regimen is effective;  continue present plan and medications.  Maybe anxiety but has other covid  symptoms  Rule out  If negative, aware to follow up with pysch    Focal seizure  The current medical regimen is effective;  continue present plan and medications.  Continue to follow up with specialist      Consult Start Time: 08/06/2020 15:32  Consult End Time: 08/06/2020 15:42                No follow-ups on file.

## 2020-08-14 DIAGNOSIS — Z11.59 NEED FOR HEPATITIS C SCREENING TEST: ICD-10-CM

## 2020-09-16 ENCOUNTER — PATIENT MESSAGE (OUTPATIENT)
Dept: NEUROLOGY | Facility: CLINIC | Age: 20
End: 2020-09-16

## 2020-09-16 DIAGNOSIS — R56.9 SEIZURES: Primary | ICD-10-CM

## 2020-09-17 ENCOUNTER — LAB VISIT (OUTPATIENT)
Dept: LAB | Facility: HOSPITAL | Age: 20
End: 2020-09-17
Attending: PSYCHIATRY & NEUROLOGY
Payer: COMMERCIAL

## 2020-09-17 DIAGNOSIS — R56.9 SEIZURES: ICD-10-CM

## 2020-09-17 PROCEDURE — 80175 DRUG SCREEN QUAN LAMOTRIGINE: CPT

## 2020-09-17 PROCEDURE — 80203 DRUG SCREEN QUANT ZONISAMIDE: CPT

## 2020-09-17 PROCEDURE — 36415 COLL VENOUS BLD VENIPUNCTURE: CPT

## 2020-09-19 LAB — ZONISAMIDE SERPL-MCNC: 20 MCG/ML (ref 10–40)

## 2020-09-21 LAB — LAMOTRIGINE SERPL-MCNC: 5.1 UG/ML (ref 2–15)

## 2020-10-06 ENCOUNTER — PATIENT MESSAGE (OUTPATIENT)
Dept: NEUROLOGY | Facility: CLINIC | Age: 20
End: 2020-10-06

## 2020-10-23 ENCOUNTER — PATIENT MESSAGE (OUTPATIENT)
Dept: NEUROLOGY | Facility: CLINIC | Age: 20
End: 2020-10-23

## 2020-10-28 ENCOUNTER — PATIENT MESSAGE (OUTPATIENT)
Dept: NEUROLOGY | Facility: CLINIC | Age: 20
End: 2020-10-28

## 2020-10-28 DIAGNOSIS — R56.9 SEIZURES: Primary | ICD-10-CM

## 2020-10-28 RX ORDER — LAMOTRIGINE 100 MG/1
200 TABLET ORAL DAILY
Qty: 60 TABLET | Refills: 2 | Status: SHIPPED | OUTPATIENT
Start: 2020-10-28 | End: 2021-10-28

## 2020-10-28 RX ORDER — ZONISAMIDE 100 MG/1
CAPSULE ORAL
Qty: 240 CAPSULE | Refills: 2 | Status: SHIPPED | OUTPATIENT
Start: 2020-10-28 | End: 2021-03-23

## 2020-11-13 ENCOUNTER — PATIENT MESSAGE (OUTPATIENT)
Dept: FAMILY MEDICINE | Facility: CLINIC | Age: 20
End: 2020-11-13

## 2020-11-13 ENCOUNTER — PATIENT MESSAGE (OUTPATIENT)
Dept: NEUROLOGY | Facility: CLINIC | Age: 20
End: 2020-11-13

## 2020-11-14 DIAGNOSIS — G40.909 NONINTRACTABLE EPILEPSY WITHOUT STATUS EPILEPTICUS, UNSPECIFIED EPILEPSY TYPE: Primary | ICD-10-CM

## 2020-11-19 RX ORDER — TRAZODONE HYDROCHLORIDE 50 MG/1
50-150 TABLET ORAL NIGHTLY PRN
Qty: 90 TABLET | Refills: 0 | Status: SHIPPED | OUTPATIENT
Start: 2020-11-19 | End: 2021-11-19

## 2020-11-23 ENCOUNTER — PATIENT MESSAGE (OUTPATIENT)
Dept: NEUROLOGY | Facility: CLINIC | Age: 20
End: 2020-11-23

## 2020-11-24 ENCOUNTER — TELEPHONE (OUTPATIENT)
Dept: FAMILY MEDICINE | Facility: CLINIC | Age: 20
End: 2020-11-24

## 2020-12-02 ENCOUNTER — PATIENT MESSAGE (OUTPATIENT)
Dept: FAMILY MEDICINE | Facility: CLINIC | Age: 20
End: 2020-12-02

## 2020-12-02 DIAGNOSIS — G40.909 NONINTRACTABLE EPILEPSY WITHOUT STATUS EPILEPTICUS, UNSPECIFIED EPILEPSY TYPE: Primary | ICD-10-CM

## 2021-04-06 ENCOUNTER — LAB VISIT (OUTPATIENT)
Dept: LAB | Facility: HOSPITAL | Age: 21
End: 2021-04-06
Attending: PSYCHIATRY & NEUROLOGY
Payer: COMMERCIAL

## 2021-04-06 DIAGNOSIS — G40.309 IDIOPATHIC GENERALIZED EPILEPSY: ICD-10-CM

## 2021-04-06 DIAGNOSIS — F45.8 ANXIETY HYPERVENTILATION: ICD-10-CM

## 2021-04-06 DIAGNOSIS — E55.9 AVITAMINOSIS D: ICD-10-CM

## 2021-04-06 DIAGNOSIS — F41.9 ANXIETY HYPERVENTILATION: ICD-10-CM

## 2021-04-06 DIAGNOSIS — G43.009 COMMON MIGRAINE: ICD-10-CM

## 2021-04-06 DIAGNOSIS — G40.219 PSYCHOMOTOR EPILEPSY, INTRACTABLE: Primary | ICD-10-CM

## 2021-04-06 DIAGNOSIS — G40.209 COMPLEX PARTIAL SEIZURES WITH CONSCIOUSNESS IMPAIRED: ICD-10-CM

## 2021-04-06 LAB
ANION GAP SERPL CALC-SCNC: 8 MMOL/L (ref 8–16)
BUN SERPL-MCNC: 14 MG/DL (ref 6–20)
CALCIUM SERPL-MCNC: 9.4 MG/DL (ref 8.7–10.5)
CHLORIDE SERPL-SCNC: 113 MMOL/L (ref 95–110)
CO2 SERPL-SCNC: 22 MMOL/L (ref 23–29)
CREAT SERPL-MCNC: 1.1 MG/DL (ref 0.5–1.4)
EST. GFR  (AFRICAN AMERICAN): >60 ML/MIN/1.73 M^2
EST. GFR  (NON AFRICAN AMERICAN): >60 ML/MIN/1.73 M^2
GLUCOSE SERPL-MCNC: 118 MG/DL (ref 70–110)
POTASSIUM SERPL-SCNC: 3.9 MMOL/L (ref 3.5–5.1)
SODIUM SERPL-SCNC: 143 MMOL/L (ref 136–145)

## 2021-04-06 PROCEDURE — 36415 COLL VENOUS BLD VENIPUNCTURE: CPT | Performed by: PSYCHIATRY & NEUROLOGY

## 2021-04-06 PROCEDURE — 80203 DRUG SCREEN QUANT ZONISAMIDE: CPT | Performed by: PSYCHIATRY & NEUROLOGY

## 2021-04-06 PROCEDURE — 80175 DRUG SCREEN QUAN LAMOTRIGINE: CPT | Performed by: PSYCHIATRY & NEUROLOGY

## 2021-04-06 PROCEDURE — 80048 BASIC METABOLIC PNL TOTAL CA: CPT | Performed by: PSYCHIATRY & NEUROLOGY

## 2021-04-08 LAB — ZONISAMIDE SERPL-MCNC: 27 MCG/ML (ref 10–40)

## 2021-04-09 LAB — LAMOTRIGINE SERPL-MCNC: 4.9 UG/ML (ref 2–15)

## 2021-04-16 ENCOUNTER — PATIENT MESSAGE (OUTPATIENT)
Dept: RESEARCH | Facility: HOSPITAL | Age: 21
End: 2021-04-16

## 2021-10-04 ENCOUNTER — PATIENT MESSAGE (OUTPATIENT)
Dept: ADMINISTRATIVE | Facility: HOSPITAL | Age: 21
End: 2021-10-04

## 2021-10-15 ENCOUNTER — TELEPHONE (OUTPATIENT)
Dept: PSYCHIATRY | Facility: CLINIC | Age: 21
End: 2021-10-15

## 2021-10-22 ENCOUNTER — PATIENT MESSAGE (OUTPATIENT)
Dept: PSYCHIATRY | Facility: CLINIC | Age: 21
End: 2021-10-22
Payer: COMMERCIAL

## 2021-10-26 ENCOUNTER — TELEPHONE (OUTPATIENT)
Dept: PSYCHIATRY | Facility: CLINIC | Age: 21
End: 2021-10-26
Payer: COMMERCIAL

## 2022-02-10 ENCOUNTER — OFFICE VISIT (OUTPATIENT)
Dept: PSYCHIATRY | Facility: CLINIC | Age: 22
End: 2022-02-10
Payer: COMMERCIAL

## 2022-02-10 DIAGNOSIS — F40.10 SOCIAL ANXIETY DISORDER: ICD-10-CM

## 2022-02-10 DIAGNOSIS — F33.0 MILD EPISODE OF RECURRENT MAJOR DEPRESSIVE DISORDER: Primary | ICD-10-CM

## 2022-02-10 PROCEDURE — 99999 PR PBB SHADOW E&M-EST. PATIENT-LVL II: CPT | Mod: PBBFAC,,, | Performed by: PSYCHOLOGIST

## 2022-02-10 PROCEDURE — 90791 PSYCH DIAGNOSTIC EVALUATION: CPT | Mod: S$GLB,,, | Performed by: PSYCHOLOGIST

## 2022-02-10 PROCEDURE — 90791 PR PSYCHIATRIC DIAGNOSTIC EVALUATION: ICD-10-PCS | Mod: S$GLB,,, | Performed by: PSYCHOLOGIST

## 2022-02-10 PROCEDURE — 99999 PR PBB SHADOW E&M-EST. PATIENT-LVL II: ICD-10-PCS | Mod: PBBFAC,,, | Performed by: PSYCHOLOGIST
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
## 2022-03-10 ENCOUNTER — OFFICE VISIT (OUTPATIENT)
Dept: PSYCHIATRY | Facility: CLINIC | Age: 22
End: 2022-03-10
Payer: COMMERCIAL

## 2022-03-10 DIAGNOSIS — F40.10 SOCIAL ANXIETY DISORDER: ICD-10-CM

## 2022-03-10 DIAGNOSIS — F33.0 MILD EPISODE OF RECURRENT MAJOR DEPRESSIVE DISORDER: Primary | ICD-10-CM

## 2022-03-10 PROCEDURE — 90837 PR PSYCHOTHERAPY W/PATIENT, 60 MIN: ICD-10-PCS | Mod: S$GLB,,, | Performed by: PSYCHOLOGIST

## 2022-03-10 PROCEDURE — 99999 PR PBB SHADOW E&M-EST. PATIENT-LVL II: ICD-10-PCS | Mod: PBBFAC,,, | Performed by: PSYCHOLOGIST

## 2022-03-10 PROCEDURE — 90837 PSYTX W PT 60 MINUTES: CPT | Mod: S$GLB,,, | Performed by: PSYCHOLOGIST

## 2022-03-10 PROCEDURE — 99999 PR PBB SHADOW E&M-EST. PATIENT-LVL II: CPT | Mod: PBBFAC,,, | Performed by: PSYCHOLOGIST
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
## 2022-03-23 ENCOUNTER — OFFICE VISIT (OUTPATIENT)
Dept: PSYCHIATRY | Facility: CLINIC | Age: 22
End: 2022-03-23
Payer: COMMERCIAL

## 2022-03-23 DIAGNOSIS — F40.10 SOCIAL ANXIETY DISORDER: ICD-10-CM

## 2022-03-23 DIAGNOSIS — F33.0 MILD EPISODE OF RECURRENT MAJOR DEPRESSIVE DISORDER: Primary | ICD-10-CM

## 2022-03-23 PROCEDURE — 90837 PSYTX W PT 60 MINUTES: CPT | Mod: S$GLB,,, | Performed by: PSYCHOLOGIST

## 2022-03-23 PROCEDURE — 90837 PR PSYCHOTHERAPY W/PATIENT, 60 MIN: ICD-10-PCS | Mod: S$GLB,,, | Performed by: PSYCHOLOGIST

## 2022-03-23 PROCEDURE — 99999 PR PBB SHADOW E&M-EST. PATIENT-LVL II: CPT | Mod: PBBFAC,,, | Performed by: PSYCHOLOGIST

## 2022-03-23 PROCEDURE — 99999 PR PBB SHADOW E&M-EST. PATIENT-LVL II: ICD-10-PCS | Mod: PBBFAC,,, | Performed by: PSYCHOLOGIST

## 2022-03-23 NOTE — PROGRESS NOTES
"Individual Psychotherapy (PhD)    03/23/2022    Site:  [facility]         Therapeutic Intervention: Met with patient.  Outpatient - Behavior modifying psychotherapy 60 min - CPT code 03435    Chief complaint/reason for encounter: depression and anxiety     Interval history and content of current session: Pt arrived on time to third session. Pt stated his work stress somewhat resolved after he wrote apology letters to co-workers. Explored pt's fear of getting "too close" with friends. Pt noted history of friends "going behind [his] back" in high school, gossiping about his seizure disorder. Helped pt identify emotions of "hurt" and "betrayal." Discussed relation between unwanted feelings and valued living. Led pt in values worksheet and completed domains of "Social life, Physical health, and "Family." Constructed goal in family of origin domain of: "talk to Dad tomorrow about scheduling a fishing trip." Pt agreed to explore other value domains in next session and to begin acceptance and defusion work in domain of social life.    Treatment plan:  · Target symptoms: recurrent depression, anxiety   · Why chosen therapy is appropriate versus another modality: relevant to diagnosis, patient responds to this modality  · Outcome monitoring methods: self-report  · Therapeutic intervention type: behavior modifying psychotherapy    Risk parameters:  Patient reports no suicidal ideation  Patient reports no homicidal ideation  Patient reports no self-injurious behavior  Patient reports no violent behavior    Verbal deficits: None    Patient's response to intervention:  The patient's response to intervention is accepting.    Progress toward goals and other mental status changes:  The patient's progress toward goals is fair .    Diagnosis:   Major depressive disorder, mild  Social anxiety disorder    Plan:  individual psychotherapy    Return to clinic: 2 weeks    Length of Service (minutes): 53      "

## 2022-04-05 ENCOUNTER — PATIENT MESSAGE (OUTPATIENT)
Dept: PSYCHIATRY | Facility: CLINIC | Age: 22
End: 2022-04-05
Payer: COMMERCIAL

## 2022-04-06 ENCOUNTER — OFFICE VISIT (OUTPATIENT)
Dept: PSYCHIATRY | Facility: CLINIC | Age: 22
End: 2022-04-06
Payer: COMMERCIAL

## 2022-04-06 DIAGNOSIS — F33.0 MILD EPISODE OF RECURRENT MAJOR DEPRESSIVE DISORDER: Primary | ICD-10-CM

## 2022-04-06 DIAGNOSIS — F40.10 SOCIAL ANXIETY DISORDER: ICD-10-CM

## 2022-04-06 PROCEDURE — 90837 PSYTX W PT 60 MINUTES: CPT | Mod: 95,,, | Performed by: PSYCHOLOGIST

## 2022-04-06 PROCEDURE — 90837 PR PSYCHOTHERAPY W/PATIENT, 60 MIN: ICD-10-PCS | Mod: 95,,, | Performed by: PSYCHOLOGIST

## 2022-04-06 NOTE — PROGRESS NOTES
The patient location is: [address] [place] [address]  The patient location [place] is: [place]  The patient phone number is: [phone]  Visit type: Virtual visit with synchronous audio and video  Each patient to whom he or she provides medical services by telemedicine is:  (1) informed of the relationship between the physician and patient and the respective role of any other health care provider with respect to management of the patient; and (2) notified that he or she may decline to receive medical services by telemedicine and may withdraw from such care at any time.    Individual Psychotherapy (PhD)    04/06/2022    Site:  [facility]         Therapeutic Intervention: Met with patient.  Outpatient - Behavior modifying psychotherapy 60 min - CPT code 58505    Chief complaint/reason for encounter: depression and anxiety     Interval history and content of current session: Pt arrived on time to 4th session. Pt stated he spoke with father about fishing, and they are looking for a time that works for them both. Pt stated that he and his father are improving emotional communication. Pt stated he recently apologized to father for distancing himself form him as a child due to alienation by mother, following divorce. Processed pt's feelings toward mother, and pt reported more anger and hurt about her treatment of him while in [place]. Discussed how experience in [place] increased pt's isolation and heightened his questions about how others perceive him, worsening his social anxiety. Led pt in Values Worksheet, and completed domains of: Recreation; Spirtiuality; Intimate Relationship; Education and Learning. Pt constructed goal in education: Read Call to Arms 15 minutes each night at 9 PM. Pt agreed to set daily arm at 9 PM as a reminder. Discussed group therapy, and pt agreed to referral. Group tx to start 04/19/22.    Treatment plan:  · Target symptoms: recurrent depression, anxiety   · Why chosen  therapy is appropriate versus another modality: relevant to diagnosis, patient responds to this modality  · Outcome monitoring methods: self-report  · Therapeutic intervention type: behavior modifying psychotherapy    Risk parameters:  Patient reports no suicidal ideation  Patient reports no homicidal ideation  Patient reports no self-injurious behavior  Patient reports no violent behavior    Verbal deficits: None    Patient's response to intervention:  The patient's response to intervention is accepting.    Progress toward goals and other mental status changes:  The patient's progress toward goals is fair .    Diagnosis:   Major depressive disorder, mild  Social anxiety disorder    Plan:  individual psychotherapy    Return to clinic: 2 weeks    Length of Service (minutes): 55

## 2022-04-14 ENCOUNTER — OFFICE VISIT (OUTPATIENT)
Dept: PSYCHIATRY | Facility: CLINIC | Age: 22
End: 2022-04-14
Payer: COMMERCIAL

## 2022-04-14 DIAGNOSIS — F33.0 MILD EPISODE OF RECURRENT MAJOR DEPRESSIVE DISORDER: Primary | ICD-10-CM

## 2022-04-14 DIAGNOSIS — F40.10 SOCIAL ANXIETY DISORDER: ICD-10-CM

## 2022-04-14 PROCEDURE — 90837 PSYTX W PT 60 MINUTES: CPT | Mod: 95,,, | Performed by: PSYCHOLOGIST

## 2022-04-14 PROCEDURE — 90837 PR PSYCHOTHERAPY W/PATIENT, 60 MIN: ICD-10-PCS | Mod: 95,,, | Performed by: PSYCHOLOGIST

## 2022-04-14 NOTE — PROGRESS NOTES
"The patient location is: [facility] [place], [place]  The patient location [place] is: [place]  The patient phone number is: [phone]  Visit type: Virtual visit with synchronous audio and video  Each patient to whom he or she provides medical services by telemedicine is:  (1) informed of the relationship between the physician and patient and the respective role of any other health care provider with respect to management of the patient; and (2) notified that he or she may decline to receive medical services by telemedicine and may withdraw from such care at any time.    Individual Psychotherapy (PhD)    04/14/2022    Site:  [facility]         Therapeutic Intervention: Met with patient.  Outpatient - Behavior modifying psychotherapy 60 min - CPT code 43799    Chief complaint/reason for encounter: depression and anxiety     Interval history and content of current session: Pt arrived on time to 5th session with the undersigned. Pt reported plan to fish with father on Monday after Easter if pt is off work. Reviewed pt's progress with education goal from previous session, and pt stated he read 15 minutes a night for 6 of 7 days last week. Completed remainder of values worksheet, including community life and work/career domains. 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 [employer]. Pt reported he is favoring [employer] option at present. Pt agreed to goal of contacting aunt and uncle for an [employer] contact to interview about career opportunities. Explored social domain and pt's fear of "getting too close." Pt stated this fear stems from inability to trust mother, including her telling her to lie to others about his academic and social life. Pt also stated he started lying to his friends in order to fit in. Discussed how this habit actually led to further feelings of ostracism because others were connecting with someone other than his genuine self. Led pt in brief self-compassion perspective-taking " exercise, and pt agreed to continue discussing self-compassion stance in this domain in next session.    Treatment plan:  · Target symptoms: recurrent depression, anxiety   · Why chosen therapy is appropriate versus another modality: relevant to diagnosis, patient responds to this modality  · Outcome monitoring methods: self-report  · Therapeutic intervention type: behavior modifying psychotherapy    Risk parameters:  Patient reports no suicidal ideation  Patient reports no homicidal ideation  Patient reports no self-injurious behavior  Patient reports no violent behavior    Verbal deficits: None    Patient's response to intervention:  The patient's response to intervention is accepting.    Progress toward goals and other mental status changes:  The patient's progress toward goals is fair .    Diagnosis:   Major depressive disorder, mild  Social anxiety disorder    Plan:  individual psychotherapy    Return to clinic: 2 weeks    Length of Service (minutes): 54

## 2022-05-03 ENCOUNTER — OFFICE VISIT (OUTPATIENT)
Dept: PSYCHIATRY | Facility: CLINIC | Age: 22
End: 2022-05-03
Payer: COMMERCIAL

## 2022-05-03 DIAGNOSIS — F33.0 MILD EPISODE OF RECURRENT MAJOR DEPRESSIVE DISORDER: ICD-10-CM

## 2022-05-03 DIAGNOSIS — F40.10 SOCIAL ANXIETY DISORDER: Primary | ICD-10-CM

## 2022-05-03 PROCEDURE — 90853 PR GROUP PSYCHOTHERAPY: ICD-10-PCS | Mod: S$GLB,,, | Performed by: PSYCHOLOGIST

## 2022-05-03 PROCEDURE — 99999 PR PBB SHADOW E&M-EST. PATIENT-LVL II: ICD-10-PCS | Mod: PBBFAC,,, | Performed by: PSYCHOLOGIST

## 2022-05-03 PROCEDURE — 99999 PR PBB SHADOW E&M-EST. PATIENT-LVL II: CPT | Mod: PBBFAC,,, | Performed by: PSYCHOLOGIST

## 2022-05-03 PROCEDURE — 90853 GROUP PSYCHOTHERAPY: CPT | Mod: S$GLB,,, | Performed by: PSYCHOLOGIST

## 2022-05-03 NOTE — PROGRESS NOTES
Group Psychotherapy     Site: [facility]     05/03/2022     Length of service: 90 minutes     Referred by: Nabeel Carvajal, Ph.D.     Number of patients in attendance: 6     Target symptoms: depression     Patient's response to intervention:  The patient's response to intervention is good     Progress toward goals and other mental status changes:  The patient's progress toward goals is good     Interval history:  Pt arrived on time to second group session. Introduced two new group members, who stated therapy goals. One group member shared experience of numbness, and other group members provided shared experiences around this feeling. Another group member shared series of traumatic experiences that exacerbated her anxiety and depression and caused fibromyalgia. Another group member shared his confusing experiences in social relationships, and the group provided him feedback.     Diagnosis: Social anxiety disorder; Major depressive diorder     Plan: Continue in group      Return to clinic: next week

## 2022-05-17 ENCOUNTER — OFFICE VISIT (OUTPATIENT)
Dept: PSYCHIATRY | Facility: CLINIC | Age: 22
End: 2022-05-17
Payer: COMMERCIAL

## 2022-05-17 DIAGNOSIS — F33.0 MILD EPISODE OF RECURRENT MAJOR DEPRESSIVE DISORDER: ICD-10-CM

## 2022-05-17 DIAGNOSIS — F40.10 SOCIAL ANXIETY DISORDER: Primary | ICD-10-CM

## 2022-05-17 PROCEDURE — 90853 GROUP PSYCHOTHERAPY: CPT | Mod: S$GLB,,, | Performed by: PSYCHOLOGIST

## 2022-05-17 PROCEDURE — 99999 PR PBB SHADOW E&M-EST. PATIENT-LVL II: CPT | Mod: PBBFAC,,, | Performed by: PSYCHOLOGIST

## 2022-05-17 PROCEDURE — 99999 PR PBB SHADOW E&M-EST. PATIENT-LVL II: ICD-10-PCS | Mod: PBBFAC,,, | Performed by: PSYCHOLOGIST

## 2022-05-17 PROCEDURE — 90853 PR GROUP PSYCHOTHERAPY: ICD-10-PCS | Mod: S$GLB,,, | Performed by: PSYCHOLOGIST

## 2022-05-24 NOTE — PROGRESS NOTES
Group Psychotherapy     Site: [facility]     05/17/2022     Length of service: 90 minutes     Referred by: Nabeel Carvajal, Ph.D.     Number of patients in attendance: 4     Target symptoms: depression     Patient's response to intervention:  The patient's response to intervention is good     Progress toward goals and other mental status changes:  The patient's progress toward goals is good     Interval history:  Pt arrived on time to 5th group session. One group member shared that he recently discovered that someone he was briefly dating was engaged. Group provided support and feedback to this member. Helped this member identify reasons pattern of of being taken advantage of continues. Another group member shared his experiences with grief. Another group member shared how her attractiveness shifts how she thinks about flirting.     Diagnosis: Social anxiety disorder; Major depressive diorder     Plan: Continue in group      Return to clinic: next week

## 2023-01-12 ENCOUNTER — OFFICE VISIT (OUTPATIENT)
Dept: PSYCHIATRY | Facility: CLINIC | Age: 23
End: 2023-01-12
Payer: COMMERCIAL

## 2023-01-12 DIAGNOSIS — F33.0 MILD EPISODE OF RECURRENT MAJOR DEPRESSIVE DISORDER: ICD-10-CM

## 2023-01-12 DIAGNOSIS — F40.10 SOCIAL ANXIETY DISORDER: Primary | ICD-10-CM

## 2023-01-12 PROCEDURE — 99999 PR PBB SHADOW E&M-EST. PATIENT-LVL II: CPT | Mod: PBBFAC,,, | Performed by: PSYCHOLOGIST

## 2023-01-12 PROCEDURE — 90837 PSYTX W PT 60 MINUTES: CPT | Mod: S$GLB,,, | Performed by: PSYCHOLOGIST

## 2023-01-12 PROCEDURE — 90837 PR PSYCHOTHERAPY W/PATIENT, 60 MIN: ICD-10-PCS | Mod: S$GLB,,, | Performed by: PSYCHOLOGIST

## 2023-01-12 PROCEDURE — 99999 PR PBB SHADOW E&M-EST. PATIENT-LVL II: ICD-10-PCS | Mod: PBBFAC,,, | Performed by: PSYCHOLOGIST

## 2023-01-12 NOTE — PROGRESS NOTES
"Individual Psychotherapy (PhD)    01/12/2023    Site:  [facility]         Therapeutic Intervention: Met with patient.  Outpatient - Behavior modifying psychotherapy 60 min - CPT code 62619    Chief complaint/reason for encounter: depression and anxiety     Interval history and content of current session: Pt arrived on time to 6th session with the undersigned. Last individual session was 4/14/2022. Pt reported stress in academic/career domain and relationship domain. He reported he recently withdrew from online school, stating that online school "is not for [him]." Discussed other options, including trade school and discussed his bias against trade school as well as his father's concern about his earning potential. Discussed how pt can engage in valued qualities of work with any career path and provided pt his values worksheet. Discussed pt's interpersonal behavior that could be interpreted as overbearing to women. Discussed exercise of reversing gender roles to determine appropriate response and to identify gender bias. He agreed to discuss defusion technique for anxious thoughts, regarding social interaction (e.g., "Do they not like me? Did I mess up?").    Treatment plan:  Target symptoms: recurrent depression, anxiety   Why chosen therapy is appropriate versus another modality: relevant to diagnosis, patient responds to this modality  Outcome monitoring methods: self-report  Therapeutic intervention type: behavior modifying psychotherapy    Risk parameters:  Patient reports no suicidal ideation  Patient reports no homicidal ideation  Patient reports no self-injurious behavior  Patient reports no violent behavior    Verbal deficits: None    Patient's response to intervention:  The patient's response to intervention is accepting.    Progress toward goals and other mental status changes:  The patient's progress toward goals is fair .    Diagnosis:   Major depressive disorder, mild  Social anxiety " disorder    Plan:  individual psychotherapy    Return to clinic: 2 weeks    Length of Service (minutes): 55

## 2023-02-01 ENCOUNTER — OFFICE VISIT (OUTPATIENT)
Dept: PSYCHIATRY | Facility: CLINIC | Age: 23
End: 2023-02-01
Payer: COMMERCIAL

## 2023-02-01 DIAGNOSIS — F33.0 MILD EPISODE OF RECURRENT MAJOR DEPRESSIVE DISORDER: ICD-10-CM

## 2023-02-01 DIAGNOSIS — F40.10 SOCIAL ANXIETY DISORDER: Primary | ICD-10-CM

## 2023-02-01 PROCEDURE — 90834 PR PSYCHOTHERAPY W/PATIENT, 45 MIN: ICD-10-PCS | Mod: 95,,, | Performed by: PSYCHOLOGIST

## 2023-02-01 PROCEDURE — 90834 PSYTX W PT 45 MINUTES: CPT | Mod: 95,,, | Performed by: PSYCHOLOGIST

## 2023-02-01 NOTE — PROGRESS NOTES
"The patient location is:  [facility] [place], [place]  The patient location [place] is: [facility]    Visit type: Virtual visit with synchronous audio and video  Each patient to whom he or she provides medical services by telemedicine is:  (1) informed of the relationship between the physician and patient and the respective role of any other health care provider with respect to management of the patient; and (2) notified that he or she may decline to receive medical services by telemedicine and may withdraw from such care at any time.    Individual Psychotherapy (PhD)    02/01/2023    Site:  [facility]         Therapeutic Intervention: Met with patient.  Outpatient - Behavior modifying psychotherapy 45 min - CPT code 31489    Chief complaint/reason for encounter: depression and anxiety     Interval history and content of current session: Pt arrived on time to 7th session with the undersigned. 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 He also reported minor conflict with a friend due to miscommunication regarding a lunch outing. Discussed pt's difficult thoughts (I.e., "Do they not like me? Did I do something wrong?") in social relationships. Identified underlying thought "There's something wrong with me" that stemmed from relationship with mother and bullying and ostracism in high school. Led pt in two-sided coin exercise with this thought and defusion technique in which pt an[place]eta a tree having this thought. Discussed pt's metaphor resulting from choice of tree character, and pt agreed to place drawing somewhere visible to continue with acceptance and defusion with this thought.    Treatment plan:  Target symptoms: recurrent depression, anxiety   Why chosen therapy is appropriate versus another modality: relevant to diagnosis, patient responds to this modality  Outcome monitoring methods: self-report  Therapeutic intervention type: " behavior modifying psychotherapy    Risk parameters:  Patient reports no suicidal ideation  Patient reports no homicidal ideation  Patient reports no self-injurious behavior  Patient reports no violent behavior    Verbal deficits: None    Patient's response to intervention:  The patient's response to intervention is accepting.    Progress toward goals and other mental status changes:  The patient's progress toward goals is fair .    Diagnosis:   Major depressive disorder, mild  Social anxiety disorder    Plan:  individual psychotherapy    Return to clinic: 2 weeks    Length of Service (minutes): 45

## 2023-06-21 ENCOUNTER — OFFICE VISIT (OUTPATIENT)
Dept: PSYCHIATRY | Facility: CLINIC | Age: 23
End: 2023-06-21
Payer: COMMERCIAL

## 2023-06-21 DIAGNOSIS — F33.0 MILD EPISODE OF RECURRENT MAJOR DEPRESSIVE DISORDER: Primary | ICD-10-CM

## 2023-06-21 DIAGNOSIS — F40.10 SOCIAL ANXIETY DISORDER: ICD-10-CM

## 2023-06-21 PROCEDURE — 90834 PR PSYCHOTHERAPY W/PATIENT, 45 MIN: ICD-10-PCS | Mod: 95,,, | Performed by: PSYCHOLOGIST

## 2023-06-21 PROCEDURE — 90834 PSYTX W PT 45 MINUTES: CPT | Mod: 95,,, | Performed by: PSYCHOLOGIST

## 2023-06-21 NOTE — PROGRESS NOTES
"The patient location is:  [address] [place] [address]  The patient location [place] is: [place]    Visit type: Virtual visit with synchronous audio and video  Each patient to whom he or she provides medical services by telemedicine is:  (1) informed of the relationship between the physician and patient and the respective role of any other health care provider with respect to management of the patient; and (2) notified that he or she may decline to receive medical services by telemedicine and may withdraw from such care at any time.    Individual Psychotherapy (PhD)    06/21/2023    Site:  [facility]         Therapeutic Intervention: Met with patient.  Outpatient - Behavior modifying psychotherapy 45 min - CPT code 58953    Chief complaint/reason for encounter: depression and anxiety     Interval history and content of current session: Pt arrived on time to 8th session with the undersigned. Pt identified task of "working on controlling anger." Pt shared that he had another incident at work in which he used a slur against a coworker. Pt ntoed that this woman was his friend, and he felt out of control when he made the comment. Provided psychoed about implicit bias and explored origin of pt's prejudice, largely from overtly racist grandparents in childhood and present. Shared "5 Parts of Anger" document with pt and helped pt identify feelings, thoughts, sensations, and behaviors associated with the anger. Pt agreed to apply until next session next week.    Treatment plan:  Target symptoms: recurrent depression, anxiety   Why chosen therapy is appropriate versus another modality: relevant to diagnosis, patient responds to this modality  Outcome monitoring methods: self-report  Therapeutic intervention type: behavior modifying psychotherapy    Risk parameters:  Patient reports no suicidal ideation  Patient reports no homicidal ideation  Patient reports no self-injurious behavior  Patient reports no violent " Patient placed on bipap settings due to apnea periods on CPAP. Settings 15/5, 18 40%   behavior    Verbal deficits: None    Patient's response to intervention:  The patient's response to intervention is accepting.    Progress toward goals and other mental status changes:  The patient's progress toward goals is fair .    Diagnosis:   Major depressive disorder, mild  Social anxiety disorder    Plan:  individual psychotherapy    Return to clinic: 2 weeks    Length of Service (minutes): 45

## 2023-06-29 ENCOUNTER — OFFICE VISIT (OUTPATIENT)
Dept: PSYCHIATRY | Facility: CLINIC | Age: 23
End: 2023-06-29
Payer: COMMERCIAL

## 2023-06-29 DIAGNOSIS — F33.0 MILD EPISODE OF RECURRENT MAJOR DEPRESSIVE DISORDER: ICD-10-CM

## 2023-06-29 DIAGNOSIS — F40.10 SOCIAL ANXIETY DISORDER: Primary | ICD-10-CM

## 2023-06-29 PROCEDURE — 90837 PR PSYCHOTHERAPY W/PATIENT, 60 MIN: ICD-10-PCS | Mod: S$GLB,,, | Performed by: PSYCHOLOGIST

## 2023-06-29 PROCEDURE — 99999 PR PBB SHADOW E&M-EST. PATIENT-LVL II: ICD-10-PCS | Mod: PBBFAC,,, | Performed by: PSYCHOLOGIST

## 2023-06-29 PROCEDURE — 99999 PR PBB SHADOW E&M-EST. PATIENT-LVL II: CPT | Mod: PBBFAC,,, | Performed by: PSYCHOLOGIST

## 2023-06-29 PROCEDURE — 90837 PSYTX W PT 60 MINUTES: CPT | Mod: S$GLB,,, | Performed by: PSYCHOLOGIST

## 2023-06-29 NOTE — PROGRESS NOTES
Individual Psychotherapy (PhD)    06/29/2023    Site:  [facility]         Therapeutic Intervention: Met with patient.  Outpatient - Behavior modifying psychotherapy 60 min - CPT code 38240    Chief complaint/reason for encounter: depression and anxiety     Interval history and content of current session: Pt arrived on time to 9th session with the undersigned. Pt stated that he recently had a positive experience of coping with stress at work, stating that he took a brief break to breathe and down-regulate. Discussed ways of handling prejudiced thoughts that automatically emerge, including noticing thoughts as thoughts and reminding himself of his chosen values. Discussed effect of lying to grandparents about who he is friends with. Discussed possibility of setting boundaries with grandparents around race issues and how following his values in this way can help loosen internalized prejudice.     Treatment plan:  Target symptoms: recurrent depression, anxiety   Why chosen therapy is appropriate versus another modality: relevant to diagnosis, patient responds to this modality  Outcome monitoring methods: self-report  Therapeutic intervention type: behavior modifying psychotherapy    Risk parameters:  Patient reports no suicidal ideation  Patient reports no homicidal ideation  Patient reports no self-injurious behavior  Patient reports no violent behavior    Verbal deficits: None    Patient's response to intervention:  The patient's response to intervention is accepting.    Progress toward goals and other mental status changes:  The patient's progress toward goals is fair .    Diagnosis:   Major depressive disorder, mild  Social anxiety disorder    Plan:  individual psychotherapy    Return to clinic: 2 weeks    Length of Service (minutes): 53

## 2023-07-07 ENCOUNTER — TELEPHONE (OUTPATIENT)
Dept: PSYCHIATRY | Facility: CLINIC | Age: 23
End: 2023-07-07
Payer: COMMERCIAL

## 2023-07-12 ENCOUNTER — OFFICE VISIT (OUTPATIENT)
Dept: PSYCHIATRY | Facility: CLINIC | Age: 23
End: 2023-07-12
Payer: COMMERCIAL

## 2023-07-12 DIAGNOSIS — F40.10 SOCIAL ANXIETY DISORDER: Primary | ICD-10-CM

## 2023-07-12 DIAGNOSIS — F33.0 MILD EPISODE OF RECURRENT MAJOR DEPRESSIVE DISORDER: ICD-10-CM

## 2023-07-12 PROCEDURE — 99999 PR PBB SHADOW E&M-EST. PATIENT-LVL II: CPT | Mod: PBBFAC,,, | Performed by: PSYCHOLOGIST

## 2023-07-12 PROCEDURE — 99999 PR PBB SHADOW E&M-EST. PATIENT-LVL II: ICD-10-PCS | Mod: PBBFAC,,, | Performed by: PSYCHOLOGIST

## 2023-07-12 PROCEDURE — 90837 PR PSYCHOTHERAPY W/PATIENT, 60 MIN: ICD-10-PCS | Mod: S$GLB,,, | Performed by: PSYCHOLOGIST

## 2023-07-12 PROCEDURE — 90837 PSYTX W PT 60 MINUTES: CPT | Mod: S$GLB,,, | Performed by: PSYCHOLOGIST

## 2023-07-12 NOTE — PROGRESS NOTES
"Individual Psychotherapy (PhD)    07/12/2023    Site:  [facility]         Therapeutic Intervention: Met with patient.  Outpatient - Behavior modifying psychotherapy 60 min - CPT code 69518    Chief complaint/reason for encounter: depression and anxiety     Interval history and content of current session: Pt arrived on time to 10th session with the undersigned. Pt reported ongoing guilt about recent coworker interaction. Normalized this feeling and helped pt process this feeling. Pt described himself as a "popular loner." Explored pt's suspicion that he "annoy[s]" others. Discussed recent text sent to other female friend. Discussed vicious cycle: Pt fears abandonment by friend --> Pt makes bid for reassurance --> Friend distances self from pt. Discussed this vicious cycle regarding his thoughts when in social situations: While socializing, pt has thought that others don't want to be his friend --> Pt gets lost in thought and shuts down --> others lose interest or view his behavior as odd and pull away. Explored valued qualities of interaction and pt agreed to observe unhelpful social thoughts as thoughts and make move toward connection in next social outing.    Treatment plan:  Target symptoms: recurrent depression, anxiety   Why chosen therapy is appropriate versus another modality: relevant to diagnosis, patient responds to this modality  Outcome monitoring methods: self-report  Therapeutic intervention type: behavior modifying psychotherapy    Risk parameters:  Patient reports no suicidal ideation  Patient reports no homicidal ideation  Patient reports no self-injurious behavior  Patient reports no violent behavior    Verbal deficits: None    Patient's response to intervention:  The patient's response to intervention is accepting.    Progress toward goals and other mental status changes:  The patient's progress toward goals is fair .    Diagnosis:   Major depressive disorder, mild  Social anxiety " disorder    Plan:  individual psychotherapy    Return to clinic: 2 weeks    Length of Service (minutes): 53                         5

## 2023-07-21 ENCOUNTER — OFFICE VISIT (OUTPATIENT)
Dept: PSYCHIATRY | Facility: CLINIC | Age: 23
End: 2023-07-21
Payer: COMMERCIAL

## 2023-07-21 DIAGNOSIS — F40.10 SOCIAL ANXIETY DISORDER: Primary | ICD-10-CM

## 2023-07-21 DIAGNOSIS — F33.0 MILD EPISODE OF RECURRENT MAJOR DEPRESSIVE DISORDER: ICD-10-CM

## 2023-07-21 PROCEDURE — 90837 PSYTX W PT 60 MINUTES: CPT | Mod: 95,,, | Performed by: PSYCHOLOGIST

## 2023-07-21 PROCEDURE — 90837 PR PSYCHOTHERAPY W/PATIENT, 60 MIN: ICD-10-PCS | Mod: 95,,, | Performed by: PSYCHOLOGIST

## 2023-07-21 NOTE — PROGRESS NOTES
Individual Psychotherapy (PhD)    07/21/2023    Site:  [facility]         Therapeutic Intervention: Met with patient.  Outpatient - Behavior modifying psychotherapy 60 min - CPT code 85649    Chief complaint/reason for encounter: depression and anxiety     Interval history and content of current session: Pt arrived on time to 11th session with the undersigned. Discussed recent betrayal by friend, who made several lies. Discussed importance of boundaries and danger of desperation for friendships causing poor boundaries. Discussed pt's meeting a new friend on night prior to session and ways to engage with her to slowly deepen the relationship without pushing away. Role-played ways of inviting her on upcoming tubing trip.    Treatment plan:  Target symptoms: recurrent depression, anxiety   Why chosen therapy is appropriate versus another modality: relevant to diagnosis, patient responds to this modality  Outcome monitoring methods: self-report  Therapeutic intervention type: behavior modifying psychotherapy    Risk parameters:  Patient reports no suicidal ideation  Patient reports no homicidal ideation  Patient reports no self-injurious behavior  Patient reports no violent behavior    Verbal deficits: None    Patient's response to intervention:  The patient's response to intervention is accepting.    Progress toward goals and other mental status changes:  The patient's progress toward goals is fair .    Diagnosis:   Major depressive disorder, mild  Social anxiety disorder    Plan:  individual psychotherapy    Return to clinic: 2 weeks    Length of Service (minutes): 53

## 2023-07-26 ENCOUNTER — OFFICE VISIT (OUTPATIENT)
Dept: PSYCHIATRY | Facility: CLINIC | Age: 23
End: 2023-07-26
Payer: COMMERCIAL

## 2023-07-26 ENCOUNTER — PATIENT MESSAGE (OUTPATIENT)
Dept: PSYCHIATRY | Facility: CLINIC | Age: 23
End: 2023-07-26
Payer: COMMERCIAL

## 2023-07-26 DIAGNOSIS — F40.10 SOCIAL ANXIETY DISORDER: Primary | ICD-10-CM

## 2023-07-26 DIAGNOSIS — F33.0 MILD EPISODE OF RECURRENT MAJOR DEPRESSIVE DISORDER: ICD-10-CM

## 2023-07-26 PROCEDURE — 90837 PR PSYCHOTHERAPY W/PATIENT, 60 MIN: ICD-10-PCS | Mod: 95,,, | Performed by: PSYCHOLOGIST

## 2023-07-26 PROCEDURE — 90837 PSYTX W PT 60 MINUTES: CPT | Mod: 95,,, | Performed by: PSYCHOLOGIST

## 2023-07-26 NOTE — PROGRESS NOTES
Individual Psychotherapy (PhD)    07/26/2023    Site:  [facility]         Therapeutic Intervention: Met with patient.  Outpatient - Behavior modifying psychotherapy 60 min - CPT code 86945    Chief complaint/reason for encounter: depression and anxiety     Interval history and content of current session: Pt arrived on time to 12th session with the undersigned. Processed pt's feelings in response to mother's text asking him for money and SSN. He reported feeling hurt, mistrustful, and confused. Used imaginary confrontation in which pt depicted what he would say to mother about her impact on him, mainly impact as a child. Pt noted that she impacted him to lie as a young child, to mistrust others, and interfered with time to socialize as a child. Discussed boundaries with mother and how to set boundary in response to mother's request. Pt noted that he has a lunch date tomorrow and is tubing for his birthday this weekend. Discussed social vulnerability in both of these settings.    Treatment plan:  Target symptoms: recurrent depression, anxiety   Why chosen therapy is appropriate versus another modality: relevant to diagnosis, patient responds to this modality  Outcome monitoring methods: self-report  Therapeutic intervention type: behavior modifying psychotherapy    Risk parameters:  Patient reports no suicidal ideation  Patient reports no homicidal ideation  Patient reports no self-injurious behavior  Patient reports no violent behavior    Verbal deficits: None    Patient's response to intervention:  The patient's response to intervention is accepting.    Progress toward goals and other mental status changes:  The patient's progress toward goals is fair .    Diagnosis:   Major depressive disorder, mild  Social anxiety disorder    Plan:  individual psychotherapy    Return to clinic: 2 weeks    Length of Service (minutes): 53

## 2024-01-16 ENCOUNTER — HOSPITAL ENCOUNTER (EMERGENCY)
Facility: HOSPITAL | Age: 24
Discharge: HOME | End: 2024-01-16
Attending: EMERGENCY MEDICINE
Payer: COMMERCIAL

## 2024-01-16 VITALS
OXYGEN SATURATION: 98 % | TEMPERATURE: 98 F | HEART RATE: 98 BPM | RESPIRATION RATE: 17 BRPM | DIASTOLIC BLOOD PRESSURE: 94 MMHG | BODY MASS INDEX: 25.1 KG/M2 | SYSTOLIC BLOOD PRESSURE: 163 MMHG | WEIGHT: 170 LBS
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
## 2024-01-16 DIAGNOSIS — S16.1XXA CERVICAL STRAIN, ACUTE, INITIAL ENCOUNTER: ICD-10-CM

## 2024-01-16 PROCEDURE — 99284 EMERGENCY DEPT VISIT MOD MDM: CPT | Mod: 25

## 2024-01-16 PROCEDURE — 25000003 PHARM REV CODE 250: Performed by: EMERGENCY MEDICINE

## 2024-01-16 RX ORDER — METHOCARBAMOL 500 MG/1
1000 TABLET, FILM COATED ORAL
Status: COMPLETED | OUTPATIENT
Start: 2024-01-16 | End: 2024-01-16

## 2024-01-16 RX ORDER — IBUPROFEN 800 MG/1
800 TABLET ORAL EVERY 6 HOURS PRN
Qty: 20 TABLET | Refills: 0 | Status: SHIPPED | OUTPATIENT
Start: 2024-01-16

## 2024-01-16 RX ORDER — METHOCARBAMOL 500 MG/1
1000 TABLET, FILM COATED ORAL 3 TIMES DAILY
Qty: 30 TABLET | Refills: 0 | Status: SHIPPED | OUTPATIENT
Start: 2024-01-16 | End: 2024-01-21

## 2024-01-16 RX ORDER — ACETAMINOPHEN 500 MG
1000 TABLET ORAL
Status: COMPLETED | OUTPATIENT
Start: 2024-01-16 | End: 2024-01-16

## 2024-01-16 RX ADMIN — ACETAMINOPHEN 1000 MG: 500 TABLET ORAL at 09:01

## 2024-01-16 RX ADMIN — METHOCARBAMOL 1000 MG: 500 TABLET ORAL at 10:01

## 2024-01-16 NOTE — Clinical Note
"Alejandro Rios" Jessica was seen and treated in our emergency department on 1/16/2024.  He may return to work on 01/18/2024.       If you have any questions or concerns, please don't hesitate to call.      Roman Bowen MD"
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
